# Patient Record
Sex: FEMALE | Race: WHITE | NOT HISPANIC OR LATINO | URBAN - METROPOLITAN AREA
[De-identification: names, ages, dates, MRNs, and addresses within clinical notes are randomized per-mention and may not be internally consistent; named-entity substitution may affect disease eponyms.]

---

## 2018-12-03 ENCOUNTER — AMBULATORY - HEALTHEAST (OUTPATIENT)
Dept: CARDIOLOGY | Facility: CLINIC | Age: 83
End: 2018-12-03

## 2018-12-03 ENCOUNTER — OFFICE VISIT - HEALTHEAST (OUTPATIENT)
Dept: CARDIOLOGY | Facility: CLINIC | Age: 83
End: 2018-12-03

## 2018-12-03 DIAGNOSIS — I42.8 NONISCHEMIC CARDIOMYOPATHY (H): ICD-10-CM

## 2018-12-03 DIAGNOSIS — I50.22 CHRONIC SYSTOLIC HEART FAILURE (H): ICD-10-CM

## 2018-12-03 DIAGNOSIS — I50.31 ACUTE DIASTOLIC CONGESTIVE HEART FAILURE (H): ICD-10-CM

## 2018-12-03 RX ORDER — BISACODYL 5 MG/1
5 TABLET, DELAYED RELEASE ORAL DAILY PRN
Status: SHIPPED | COMMUNITY
Start: 2018-12-03

## 2018-12-03 RX ORDER — FUROSEMIDE 20 MG
20 TABLET ORAL DAILY
Qty: 90 TABLET | Refills: 3 | Status: SHIPPED | OUTPATIENT
Start: 2018-12-03

## 2018-12-03 RX ORDER — CARVEDILOL 6.25 MG/1
6.25 TABLET ORAL 2 TIMES DAILY
Qty: 180 TABLET | Refills: 3 | Status: SHIPPED | OUTPATIENT
Start: 2018-12-03

## 2018-12-03 RX ORDER — LOSARTAN POTASSIUM 25 MG/1
12.5 TABLET ORAL DAILY
Qty: 45 TABLET | Refills: 3 | Status: SHIPPED | OUTPATIENT
Start: 2018-12-03

## 2018-12-03 RX ORDER — ATORVASTATIN CALCIUM 20 MG/1
20 TABLET, FILM COATED ORAL DAILY
Qty: 90 TABLET | Refills: 3 | Status: SHIPPED | OUTPATIENT
Start: 2018-12-03

## 2018-12-03 ASSESSMENT — MIFFLIN-ST. JEOR: SCORE: 1150.29

## 2018-12-31 ENCOUNTER — OFFICE VISIT - HEALTHEAST (OUTPATIENT)
Dept: CARDIOLOGY | Facility: CLINIC | Age: 83
End: 2018-12-31

## 2018-12-31 ENCOUNTER — AMBULATORY - HEALTHEAST (OUTPATIENT)
Dept: CARDIOLOGY | Facility: CLINIC | Age: 83
End: 2018-12-31

## 2018-12-31 DIAGNOSIS — I42.8 NONISCHEMIC CARDIOMYOPATHY (H): ICD-10-CM

## 2018-12-31 DIAGNOSIS — I50.22 CHRONIC SYSTOLIC HEART FAILURE (H): ICD-10-CM

## 2018-12-31 RX ORDER — CARVEDILOL 3.12 MG/1
TABLET ORAL
Qty: 180 TABLET | Refills: 3 | Status: SHIPPED | OUTPATIENT
Start: 2018-12-31

## 2018-12-31 ASSESSMENT — MIFFLIN-ST. JEOR: SCORE: 1123.07

## 2021-06-02 VITALS — WEIGHT: 154 LBS | HEIGHT: 66 IN | BODY MASS INDEX: 24.75 KG/M2

## 2021-06-02 VITALS — BODY MASS INDEX: 23.78 KG/M2 | HEIGHT: 66 IN | WEIGHT: 148 LBS

## 2021-06-16 PROBLEM — N18.30 CKD (CHRONIC KIDNEY DISEASE) STAGE 3, GFR 30-59 ML/MIN (H): Status: ACTIVE | Noted: 2018-11-16

## 2021-06-16 PROBLEM — I42.8 NONISCHEMIC CARDIOMYOPATHY (H): Status: ACTIVE | Noted: 2018-11-19

## 2021-06-16 PROBLEM — J96.01 ACUTE RESPIRATORY FAILURE WITH HYPOXIA (H): Status: ACTIVE | Noted: 2018-11-16

## 2021-06-16 PROBLEM — I16.9 HYPERTENSIVE CRISIS: Status: ACTIVE | Noted: 2018-11-16

## 2021-06-16 PROBLEM — R06.00 DYSPNEA: Status: ACTIVE | Noted: 2018-11-16

## 2021-06-22 NOTE — PROGRESS NOTES
Patient seen in clinic for HF education s/p recent hospital discharge 11/25/18.  Reviewed HF Binder that includes the  HF Sx Awareness & Action plan  handout and  A [employer] Pump  booklet and Weight log booklet highlighting :  __X_patient s type of heart failure _X__Na management in diet  __X_importance of daily wts  _X__Fluid Guidelines, if applicable  __X_medication review and importance of compliance     Instructed patient in signs and sx of heart failure, reiterated when to call clinic - reviewed HF hotline # [phone] and after hours call # [phone].  Majority of time was spent reviewing: Signs and symptoms of heart failure and low sodium diet.   Patient verbalized understanding of HF discussion.  Plan for f/u with continued HF education reviewed.

## 2021-06-22 NOTE — PROGRESS NOTES
Patient seen in clinic for continued HF education.  Patient viewed 'What is heart Failure' video which covers risk factors, symptoms, medications, Na and fluid guidelines, balancing activity and rest, and daily monitoring of symptoms.    Addressed patients questions/concerns- will continue to reinforce HF education with future patient interactions.      Son and patient both viewed video. No questions afterwards. Patient reports that video informative. ADRIANA,Rn

## 2021-06-26 NOTE — PROGRESS NOTES
Progress Notes by Brianda Francis CNP at 12/3/2018  7:50 AM     Author: Brianda Francis CNP Service: -- Author Type: Nurse Practitioner    Filed: 12/3/2018  9:13 AM Encounter Date: 12/3/2018 Status: Signed    : Brianda Francis CNP (Nurse Practitioner)           Click to link to [facility] HEART [facility] NOTE      Assessment/Recommendations   Assessment:    1. Nonischemic cardiomyopathy with systolic dysfunction, NYHA class II: Well compensated.  She has trace lower extremity edema.  Her weights have remained stable since discharge.  We discussed monitoring heart failure symptoms, following a low-sodium diet, monitoring daily weights, and heart failure treatment.  She states she has only been taking her carvedilol daily and started this twice a day this past Friday.  She states she is tolerating taking it twice a day.  We discussed the goal of medication titration and rechecking an echocardiogram in 3 months.  She met with the nurse clinician for further heart failure education.    2.  Hypertension: Controlled.  Blood pressure 128/80    Plan:  1.   Heart failure medications:  - Beta blocker therapy with carvedilol 6.25 mg twice a day  - ARB therapy with losartan 12.5 mg daily  - Diuretic therapy with furosemide 20 mg daily  2.  Continue current medications.  No titration of medications since she just started taking carvedilol twice a day a few days ago.  3.  Continue daily weights and low-sodium diet    Efren Cuevas will follow up with Dr. Talley in 6-8 weeks and in the heart failure clinic in 3 weeks.     History of Present Illness    Efren Cuevas is seen at [facility] heart failure clinic today for post-hospitalization follow-up. Her son Nakul accompanies her today.  She was hospitalized at [facility] from November 15 - November 19, 2018 with shortness of breath.  Her BNP was 1775 on admission.  She received IV Lasix.  Her blood pressure was also 231/121  on admission.  She had a nuclear stress test November 19, 2018 which showed no significant ischemia.  The most recent evaluation of Her ejection fraction was 15-20% from an  echo on 11/16/2018.  Her echocardiogram also showed moderate mitral regurgitation.  She was started on losartan, carvedilol, Lasix, and atorvastatin at discharge. Her past medical history is also significant for hypertension and chronic kidney disease.      Since being discharged from the hospital, Efren feels that she is improving. She has fatigue, mild dyspnea on exertion. She denies orthopnea or PND. She was having dizziness but this has improved.  She denies lightheadedness, shortness of breath, orthopnea, PND, palpitations, chest pain and abdominal fullness/bloating.      Efren Leung weight at discharge was 157 pounds.  She is monitoring home weights which are stable between 153-155 pounds.  She is following a low sodium diet.  She lives alone.        ECHO 11/16/2018:   Summary     1. The left ventricle is normal in size. Left ventricular systolic performance is severely reduced. The ejection fraction is estimated to be 15-20%.   2. There is severe global reduction in left ventricular systolic performance.   3. There is mild aortic insufficiency.   4. There is moderate mitral insufficiency.   5. Normal right ventricular size and systolic performance.   6. There is mild to moderate left atrial enlargement.   7. Right ventricular systolic pressure relative to right atrial pressure is mildly increased.  The pulmonary artery pressure is estimated to be 40-45 mmHg plus right atrial pressure. In addition, the IVC appears dilated with decreased phasic variation in caval diameter consistent with elevated right atrial pressure.   8. There is a very small pericardial effusion.           Physical Examination Review of Systems   Vitals:    12/03/18 0812   BP: 128/80   Pulse: 68   Resp: 18     Body mass index is 24.86 kg/m .  Wt Readings from Last 3  Encounters:   12/03/18 154 lb (69.9 kg)   11/25/18 161 lb (73 kg)   11/18/18 157 lb 12.8 oz (71.6 kg)       General Appearance:     Alert, cooperative and in no acute distress.   ENT/Mouth: membranes moist, no oral lesions or bleeding gums.      EYES:  no scleral icterus, normal conjunctivae   Neck: no carotid bruits or thyromegaly   Chest/Lungs:   lungs are clear to auscultation, no rales or wheezing, respirations unlabored   Cardiovascular:   Regular. Normal first and second heart sounds with no murmurs, rubs, or gallops; the carotid, radial and posterior tibial pulses are intact, Jugular venous pressure normal, trace edema bilateral lower extremities    Abdomen:  Soft, nontender, nondistended, bowel sounds present   Extremities: no cyanosis or clubbing   Skin: warm, dry.    Neurologic: mood and affect are appropriate, alert and oriented x3      General: Weight Loss  Eyes: WNL  Ears/Nose/Throat: WNL  Lungs: WNL  Heart: WNL  Stomach: WNL  Bladder: WNL  Muscle/Joints: WNL  Skin: WNL  Nervous System: WNL  Mental Health: WNL     Blood: WNL     Medical History  Surgical History Family History Social History   History reviewed. No pertinent past medical history. Past Surgical History:   Procedure Laterality Date   ? HYSTERECTOMY      History reviewed. No pertinent family history. Social History     Socioeconomic History   ? Marital status:      Spouse name: Not on file   ? Number of children: Not on file   ? Years of education: Not on file   ? Highest education level: Not on file   Social Needs   ? Financial resource strain: Not on file   ? Food insecurity - worry: Not on file   ? Food insecurity - inability: Not on file   ? Transportation needs - medical: Not on file   ? Transportation needs - non-medical: Not on file   Occupational History   ? Not on file   Tobacco Use   ? Smoking status: Never Smoker   ? Smokeless tobacco: Never Used   Substance and Sexual Activity   ? Alcohol use: No     Frequency: Never   ?  Drug use: No   ? Sexual activity: Not Currently     Partners: Male   Other Topics Concern   ? Not on file   Social History Narrative   ? Not on file          Medications  Allergies   Current Outpatient Medications   Medication Sig Dispense Refill   ? aspirin 81 MG EC tablet Take 1 tablet (81 mg total) by mouth daily.  0   ? atorvastatin (LIPITOR) 20 MG tablet Take 1 tablet (20 mg total) by mouth daily. 90 tablet 3   ? bisacodyl (DULCOLAX) 5 mg EC tablet Take 5 mg by mouth daily as needed for constipation.     ? carvedilol (COREG) 6.25 MG tablet Take 1 tablet (6.25 mg total) by mouth 2 (two) times a day. 180 tablet 3   ? losartan (COZAAR) 25 MG tablet Take 0.5 tablets (12.5 mg total) by mouth daily. 45 tablet 3   ? multivitamin therapeutic tablet Take 1 tablet by mouth daily.     ? furosemide (LASIX) 20 MG tablet Take 1 tablet (20 mg total) by mouth daily. 90 tablet 3     No current facility-administered medications for this visit.       No Known Allergies      Lab Results    Chemistry CBC BNP   Lab Results   Component Value Date    CREATININE 1.72 (H) 11/25/2018    BUN 35 (H) 11/25/2018     11/25/2018    K 4.2 11/25/2018     11/25/2018    CO2 26 11/25/2018     Creatinine (mg/dL)   Date Value   11/25/2018 1.72 (H)   11/18/2018 1.59 (H)   11/17/2018 1.43 (H)   11/16/2018 1.38 (H)    Lab Results   Component Value Date    WBC 9.4 11/25/2018    HGB 9.6 (L) 11/25/2018    HCT 30.8 (L) 11/25/2018    MCV 86 11/25/2018     11/25/2018    Lab Results   Component Value Date    BNP 1,775 (H) 11/15/2018     BNP (pg/mL)   Date Value   11/15/2018 1,775 (H)          40 minutes were spent with the patient with greater than 50% spent on education and counseling.      Brianda Francis, [facility]   Heart Failure Clinic

## 2021-06-27 NOTE — PROGRESS NOTES
Progress Notes by Brianda Francis CNP at 12/31/2018  7:50 AM     Author: Brianda Francis CNP Service: -- Author Type: Nurse Practitioner    Filed: 12/31/2018  8:57 AM Encounter Date: 12/31/2018 Status: Signed    : Brianda Francis CNP (Nurse Practitioner)           Click to link to [facility] HEART CARE NOTE      Assessment/Recommendations   Assessment:    1. Nonischemic cardiomyopathy with systolic dysfunction, NYHA class II: Well compensated.  She continues to have fatigue and dyspnea on exertion.  Her lower extremity edema has improved.  Her weights have remained stable.  She continues to follow a low-sodium diet.  She watched the heart failure video today.  We discussed the goal of medication titration again today.  Will recheck echocardiogram early March.    2.  Hypertension: Uncontrolled.  Blood pressure 160/80 with a recheck of 146/80    Plan:  1.   Heart failure medications:  - Beta blocker therapy with carvedilol increased to 9.375 mg twice a day  - ARB therapy with losartan 12.5 mg daily.  - Diuretic therapy with furosemide 20 mg daily  2.  She is unsure of her medications today and will call [facility] and make sure our med list is correct in the computer  3.  Continue daily weights and low-sodium diet  3.  Encouraged regular exercise    Efren Cuevas will follow up with Dr. Talley February 8 in the heart failure clinic in 2 months.     History of Present Illness    Ms. Efren Cuevas is a 85 y.o. female seen at [facility] heart failure clinic today for continued follow-up.  Her son Nakul accompanies her today.  She follows up for nonischemic cardiomyopathy with systolic dysfunction.  Her most recent echocardiogram was done November 16, 2018 which showed ejection fraction of 15-20% along with moderate mitral regurgitation.  She is a past medical history significant for hypertension and chronic kidney disease stage III.    Today, she continues to  have fatigue and mild dyspnea on exertion.  She states her lower extremity edema has improved.  She denies orthopnea or PND.  She denies lightheadedness, shortness of breath, orthopnea, PND, palpitations, chest pain, abdominal fullness/bloating and lower extremity edema.      She is monitoring home weights which are stable between 147-151 pounds.  She is following a low sodium diet.  She lives alone and is very active in her home.    ECHO 11/16/2018:   Summary     1. The left ventricle is normal in size. Left ventricular systolic performance is severely reduced. The ejection fraction is estimated to be 15-20%.   2. There is severe global reduction in left ventricular systolic performance.   3. There is mild aortic insufficiency.   4. There is moderate mitral insufficiency.   5. Normal right ventricular size and systolic performance.   6. There is mild to moderate left atrial enlargement.   7. Right ventricular systolic pressure relative to right atrial pressure is mildly increased.  The pulmonary artery pressure is estimated to be 40-45 mmHg plus right atrial pressure. In addition, the IVC appears dilated with decreased phasic variation in caval diameter consistent with elevated right atrial pressure.   8. There is a very small pericardial effusion          Physical Examination Review of Systems   Vitals:    12/31/18 0814   BP: 146/80   Pulse:    Resp:      Body mass index is 23.89 kg/m .  Wt Readings from Last 3 Encounters:   12/31/18 148 lb (67.1 kg)   12/03/18 154 lb (69.9 kg)   11/25/18 161 lb (73 kg)       General Appearance:     Alert, cooperative and in no acute distress.   ENT/Mouth: membranes moist, no oral lesions or bleeding gums.      EYES:  no scleral icterus, normal conjunctivae   Neck: no carotid bruits or thyromegaly   Chest/Lungs:   lungs are clear to auscultation, no rales or wheezing, respirations unlabored   Cardiovascular:   Regular. Normal first and second heart sounds with no murmurs, rubs, or  gallops; the carotid, radial and posterior tibial pulses are intact, Jugular venous pressure normal, no edema     Abdomen:  Soft, nontender, nondistended, bowel sounds present   Extremities: no cyanosis or clubbing   Skin: warm, dry.    Neurologic: mood and affect are appropriate, alert and oriented x3      General: WNL  Eyes: WNL  Ears/Nose/Throat: WNL  Lungs: WNL  Heart: WNL  Stomach: WNL  Bladder: WNL  Muscle/Joints: WNL  Skin: WNL  Nervous System: WNL  Mental Health: WNL     Blood: WNL     Medical History  Surgical History Family History Social History   History reviewed. No pertinent past medical history. Past Surgical History:   Procedure Laterality Date   ? HYSTERECTOMY      History reviewed. No pertinent family history. Social History     Socioeconomic History   ? Marital status:      Spouse name: Not on file   ? Number of children: Not on file   ? Years of education: Not on file   ? Highest education level: Not on file   Social Needs   ? Financial resource strain: Not on file   ? Food insecurity - worry: Not on file   ? Food insecurity - inability: Not on file   ? Transportation needs - medical: Not on file   ? Transportation needs - non-medical: Not on file   Occupational History   ? Not on file   Tobacco Use   ? Smoking status: Never Smoker   ? Smokeless tobacco: Never Used   Substance and Sexual Activity   ? Alcohol use: No     Frequency: Never   ? Drug use: No   ? Sexual activity: Not Currently     Partners: Male   Other Topics Concern   ? Not on file   Social History Narrative   ? Not on file          Medications  Allergies   Current Outpatient Medications   Medication Sig Dispense Refill   ? aspirin 81 MG EC tablet Take 1 tablet (81 mg total) by mouth daily.  0   ? atorvastatin (LIPITOR) 20 MG tablet Take 1 tablet (20 mg total) by mouth daily. 90 tablet 3   ? bisacodyl (DULCOLAX) 5 mg EC tablet Take 5 mg by mouth daily as needed for constipation.     ? carvedilol (COREG) 6.25 MG tablet Take 1  tablet (6.25 mg total) by mouth 2 (two) times a day. 180 tablet 3   ? furosemide (LASIX) 20 MG tablet Take 1 tablet (20 mg total) by mouth daily. 90 tablet 3   ? losartan (COZAAR) 25 MG tablet Take 0.5 tablets (12.5 mg total) by mouth daily. 45 tablet 3   ? multivitamin therapeutic tablet Take 1 tablet by mouth daily.     ? carvedilol (COREG) 3.125 MG tablet Take a total of 9.375 mg twice a day. Take one 6.25 mg tablet with one 3.125 mg tablet twice a day 180 tablet 3     No current facility-administered medications for this visit.       No Known Allergies      Lab Results    Chemistry CBC BNP   Lab Results   Component Value Date    CREATININE 1.72 (H) 11/25/2018    BUN 35 (H) 11/25/2018     11/25/2018    K 4.2 11/25/2018     11/25/2018    CO2 26 11/25/2018     Creatinine (mg/dL)   Date Value   11/25/2018 1.72 (H)   11/18/2018 1.59 (H)   11/17/2018 1.43 (H)   11/16/2018 1.38 (H)    Lab Results   Component Value Date    WBC 9.4 11/25/2018    HGB 9.6 (L) 11/25/2018    HCT 30.8 (L) 11/25/2018    MCV 86 11/25/2018     11/25/2018    Lab Results   Component Value Date    BNP 1,775 (H) 11/15/2018     BNP (pg/mL)   Date Value   11/15/2018 1,775 (H)          25 minutes were spent with the patient with greater than 50% spent on education and counseling.      Brianda Francis, [facility]   Heart Failure Clinic

## 2021-07-14 PROBLEM — I50.31 ACUTE DIASTOLIC CONGESTIVE HEART FAILURE (H): Status: RESOLVED | Noted: 2018-11-16 | Resolved: 2018-12-03

## 2021-11-18 ENCOUNTER — LAB REQUISITION (OUTPATIENT)
Dept: LAB | Facility: CLINIC | Age: 86
End: 2021-11-18

## 2021-11-18 DIAGNOSIS — D63.8 ANEMIA IN OTHER CHRONIC DISEASES CLASSIFIED ELSEWHERE: ICD-10-CM

## 2021-11-18 DIAGNOSIS — L08.9 LOCAL INFECTION OF THE SKIN AND SUBCUTANEOUS TISSUE, UNSPECIFIED: ICD-10-CM

## 2021-11-19 LAB
CREAT SERPL-MCNC: 0.81 MG/DL (ref 0.6–1.1)
GFR SERPL CREATININE-BSD FRML MDRD: 65 ML/MIN/1.73M2
HGB BLD-MCNC: 11.3 G/DL (ref 11.7–15.7)
POTASSIUM BLD-SCNC: 3.5 MMOL/L (ref 3.5–5)
WBC # BLD AUTO: 22.7 10E3/UL (ref 4–11)

## 2021-11-19 PROCEDURE — 36415 COLL VENOUS BLD VENIPUNCTURE: CPT | Performed by: INTERNAL MEDICINE

## 2021-11-19 PROCEDURE — 85018 HEMOGLOBIN: CPT | Performed by: INTERNAL MEDICINE

## 2021-11-19 PROCEDURE — 82565 ASSAY OF CREATININE: CPT | Performed by: INTERNAL MEDICINE

## 2021-11-19 PROCEDURE — P9604 ONE-WAY ALLOW PRORATED TRIP: HCPCS | Performed by: INTERNAL MEDICINE

## 2021-11-19 PROCEDURE — 85048 AUTOMATED LEUKOCYTE COUNT: CPT | Performed by: INTERNAL MEDICINE

## 2021-11-19 PROCEDURE — 84132 ASSAY OF SERUM POTASSIUM: CPT | Performed by: INTERNAL MEDICINE

## 2021-11-20 ENCOUNTER — LAB REQUISITION (OUTPATIENT)
Dept: LAB | Facility: CLINIC | Age: 86
End: 2021-11-20

## 2021-11-20 DIAGNOSIS — D72.829 ELEVATED WHITE BLOOD CELL COUNT, UNSPECIFIED: ICD-10-CM

## 2021-11-22 LAB
ANION GAP SERPL CALCULATED.3IONS-SCNC: 15 MMOL/L (ref 5–18)
BASOPHILS # BLD MANUAL: 0 10E3/UL (ref 0–0.2)
BASOPHILS NFR BLD MANUAL: 0 %
BUN SERPL-MCNC: 18 MG/DL (ref 8–28)
CALCIUM SERPL-MCNC: 9.5 MG/DL (ref 8.5–10.5)
CHLORIDE BLD-SCNC: 101 MMOL/L (ref 98–107)
CO2 SERPL-SCNC: 26 MMOL/L (ref 22–31)
CREAT SERPL-MCNC: 0.89 MG/DL (ref 0.6–1.1)
EOSINOPHIL # BLD MANUAL: 0.5 10E3/UL (ref 0–0.7)
EOSINOPHIL NFR BLD MANUAL: 2 %
ERYTHROCYTE [DISTWIDTH] IN BLOOD BY AUTOMATED COUNT: 15 % (ref 10–15)
GFR SERPL CREATININE-BSD FRML MDRD: 58 ML/MIN/1.73M2
GLUCOSE BLD-MCNC: 97 MG/DL (ref 70–125)
HCT VFR BLD AUTO: 34.8 % (ref 35–47)
HGB BLD-MCNC: 10.9 G/DL (ref 11.7–15.7)
LYMPHOCYTES # BLD MANUAL: 1.7 10E3/UL (ref 0.8–5.3)
LYMPHOCYTES NFR BLD MANUAL: 7 %
MCH RBC QN AUTO: 30.4 PG (ref 26.5–33)
MCHC RBC AUTO-ENTMCNC: 31.3 G/DL (ref 31.5–36.5)
MCV RBC AUTO: 97 FL (ref 78–100)
MONOCYTES # BLD MANUAL: 1.4 10E3/UL (ref 0–1.3)
MONOCYTES NFR BLD MANUAL: 6 %
NEUTROPHILS # BLD MANUAL: 20.4 10E3/UL (ref 1.6–8.3)
NEUTROPHILS NFR BLD MANUAL: 85 %
PLAT MORPH BLD: ABNORMAL
PLATELET # BLD AUTO: 575 10E3/UL (ref 150–450)
POTASSIUM BLD-SCNC: 3.2 MMOL/L (ref 3.5–5)
RBC # BLD AUTO: 3.58 10E6/UL (ref 3.8–5.2)
RBC MORPH BLD: ABNORMAL
SODIUM SERPL-SCNC: 142 MMOL/L (ref 136–145)
WBC # BLD AUTO: 24 10E3/UL (ref 4–11)

## 2021-11-22 PROCEDURE — 80048 BASIC METABOLIC PNL TOTAL CA: CPT | Performed by: INTERNAL MEDICINE

## 2021-11-22 PROCEDURE — P9604 ONE-WAY ALLOW PRORATED TRIP: HCPCS | Performed by: INTERNAL MEDICINE

## 2021-11-22 PROCEDURE — 36415 COLL VENOUS BLD VENIPUNCTURE: CPT | Performed by: INTERNAL MEDICINE

## 2021-11-22 PROCEDURE — 85027 COMPLETE CBC AUTOMATED: CPT | Performed by: INTERNAL MEDICINE

## 2021-11-25 ENCOUNTER — LAB REQUISITION (OUTPATIENT)
Dept: LAB | Facility: CLINIC | Age: 86
End: 2021-11-25

## 2021-11-25 DIAGNOSIS — A49.9 BACTERIAL INFECTION, UNSPECIFIED: ICD-10-CM

## 2021-11-29 LAB
ANION GAP SERPL CALCULATED.3IONS-SCNC: 14 MMOL/L (ref 5–18)
BASOPHILS # BLD AUTO: 0.1 10E3/UL (ref 0–0.2)
BASOPHILS NFR BLD AUTO: 1 %
BUN SERPL-MCNC: 18 MG/DL (ref 8–28)
CALCIUM SERPL-MCNC: 9.6 MG/DL (ref 8.5–10.5)
CHLORIDE BLD-SCNC: 103 MMOL/L (ref 98–107)
CO2 SERPL-SCNC: 26 MMOL/L (ref 22–31)
CREAT SERPL-MCNC: 1.06 MG/DL (ref 0.6–1.1)
EOSINOPHIL # BLD AUTO: 0.7 10E3/UL (ref 0–0.7)
EOSINOPHIL NFR BLD AUTO: 4 %
ERYTHROCYTE [DISTWIDTH] IN BLOOD BY AUTOMATED COUNT: 14.9 % (ref 10–15)
GFR SERPL CREATININE-BSD FRML MDRD: 47 ML/MIN/1.73M2
GLUCOSE BLD-MCNC: 77 MG/DL (ref 70–125)
HCT VFR BLD AUTO: 35.2 % (ref 35–47)
HGB BLD-MCNC: 10.9 G/DL (ref 11.7–15.7)
IMM GRANULOCYTES # BLD: 0.2 10E3/UL
IMM GRANULOCYTES NFR BLD: 1 %
LYMPHOCYTES # BLD AUTO: 1.4 10E3/UL (ref 0.8–5.3)
LYMPHOCYTES NFR BLD AUTO: 7 %
MCH RBC QN AUTO: 29.8 PG (ref 26.5–33)
MCHC RBC AUTO-ENTMCNC: 31 G/DL (ref 31.5–36.5)
MCV RBC AUTO: 96 FL (ref 78–100)
MONOCYTES # BLD AUTO: 1.4 10E3/UL (ref 0–1.3)
MONOCYTES NFR BLD AUTO: 7 %
NEUTROPHILS # BLD AUTO: 16.1 10E3/UL (ref 1.6–8.3)
NEUTROPHILS NFR BLD AUTO: 80 %
NRBC # BLD AUTO: 0 10E3/UL
NRBC BLD AUTO-RTO: 0 /100
PLATELET # BLD AUTO: 545 10E3/UL (ref 150–450)
POTASSIUM BLD-SCNC: 3.9 MMOL/L (ref 3.5–5)
RBC # BLD AUTO: 3.66 10E6/UL (ref 3.8–5.2)
SODIUM SERPL-SCNC: 143 MMOL/L (ref 136–145)
WBC # BLD AUTO: 19.9 10E3/UL (ref 4–11)

## 2021-11-29 PROCEDURE — 80048 BASIC METABOLIC PNL TOTAL CA: CPT | Performed by: INTERNAL MEDICINE

## 2021-11-29 PROCEDURE — 85025 COMPLETE CBC W/AUTO DIFF WBC: CPT | Performed by: INTERNAL MEDICINE

## 2021-11-29 PROCEDURE — 36415 COLL VENOUS BLD VENIPUNCTURE: CPT | Performed by: INTERNAL MEDICINE

## 2021-11-29 PROCEDURE — P9604 ONE-WAY ALLOW PRORATED TRIP: HCPCS | Performed by: INTERNAL MEDICINE

## 2021-12-04 ENCOUNTER — LAB REQUISITION (OUTPATIENT)
Dept: LAB | Facility: CLINIC | Age: 86
End: 2021-12-04

## 2021-12-04 DIAGNOSIS — N18.1 CHRONIC KIDNEY DISEASE, STAGE 1: ICD-10-CM

## 2021-12-04 DIAGNOSIS — D72.829 ELEVATED WHITE BLOOD CELL COUNT, UNSPECIFIED: ICD-10-CM

## 2021-12-06 LAB
ANION GAP SERPL CALCULATED.3IONS-SCNC: 11 MMOL/L (ref 5–18)
BASOPHILS # BLD AUTO: 0.1 10E3/UL (ref 0–0.2)
BASOPHILS NFR BLD AUTO: 1 %
BUN SERPL-MCNC: 11 MG/DL (ref 8–28)
CALCIUM SERPL-MCNC: 9 MG/DL (ref 8.5–10.5)
CHLORIDE BLD-SCNC: 106 MMOL/L (ref 98–107)
CO2 SERPL-SCNC: 26 MMOL/L (ref 22–31)
CREAT SERPL-MCNC: 0.82 MG/DL (ref 0.6–1.1)
EOSINOPHIL # BLD AUTO: 0.5 10E3/UL (ref 0–0.7)
EOSINOPHIL NFR BLD AUTO: 4 %
ERYTHROCYTE [DISTWIDTH] IN BLOOD BY AUTOMATED COUNT: 14.9 % (ref 10–15)
GFR SERPL CREATININE-BSD FRML MDRD: 64 ML/MIN/1.73M2
GLUCOSE BLD-MCNC: 107 MG/DL (ref 70–125)
HCT VFR BLD AUTO: 35.6 % (ref 35–47)
HGB BLD-MCNC: 10.9 G/DL (ref 11.7–15.7)
IMM GRANULOCYTES # BLD: 0.1 10E3/UL
IMM GRANULOCYTES NFR BLD: 1 %
LYMPHOCYTES # BLD AUTO: 1.3 10E3/UL (ref 0.8–5.3)
LYMPHOCYTES NFR BLD AUTO: 9 %
MCH RBC QN AUTO: 29.5 PG (ref 26.5–33)
MCHC RBC AUTO-ENTMCNC: 30.6 G/DL (ref 31.5–36.5)
MCV RBC AUTO: 97 FL (ref 78–100)
MONOCYTES # BLD AUTO: 1 10E3/UL (ref 0–1.3)
MONOCYTES NFR BLD AUTO: 7 %
NEUTROPHILS # BLD AUTO: 12.2 10E3/UL (ref 1.6–8.3)
NEUTROPHILS NFR BLD AUTO: 78 %
NRBC # BLD AUTO: 0 10E3/UL
NRBC BLD AUTO-RTO: 0 /100
PLATELET # BLD AUTO: 449 10E3/UL (ref 150–450)
POTASSIUM BLD-SCNC: 4 MMOL/L (ref 3.5–5)
RBC # BLD AUTO: 3.69 10E6/UL (ref 3.8–5.2)
SODIUM SERPL-SCNC: 143 MMOL/L (ref 136–145)
WBC # BLD AUTO: 15.3 10E3/UL (ref 4–11)

## 2021-12-06 PROCEDURE — 82310 ASSAY OF CALCIUM: CPT | Performed by: INTERNAL MEDICINE

## 2021-12-06 PROCEDURE — P9604 ONE-WAY ALLOW PRORATED TRIP: HCPCS | Performed by: INTERNAL MEDICINE

## 2021-12-06 PROCEDURE — 36415 COLL VENOUS BLD VENIPUNCTURE: CPT | Performed by: INTERNAL MEDICINE

## 2021-12-06 PROCEDURE — 85025 COMPLETE CBC W/AUTO DIFF WBC: CPT | Performed by: INTERNAL MEDICINE

## 2021-12-13 ENCOUNTER — LAB REQUISITION (OUTPATIENT)
Dept: LAB | Facility: CLINIC | Age: 86
End: 2021-12-13

## 2021-12-13 DIAGNOSIS — N18.4 CHRONIC KIDNEY DISEASE, STAGE 4 (SEVERE) (H): ICD-10-CM

## 2021-12-14 LAB
ERYTHROCYTE [DISTWIDTH] IN BLOOD BY AUTOMATED COUNT: 15.3 % (ref 10–15)
HCT VFR BLD AUTO: 34.7 % (ref 35–47)
HGB BLD-MCNC: 11 G/DL (ref 11.7–15.7)
MCH RBC QN AUTO: 29.3 PG (ref 26.5–33)
MCHC RBC AUTO-ENTMCNC: 31.7 G/DL (ref 31.5–36.5)
MCV RBC AUTO: 92 FL (ref 78–100)
PLATELET # BLD AUTO: 361 10E3/UL (ref 150–450)
RBC # BLD AUTO: 3.76 10E6/UL (ref 3.8–5.2)
WBC # BLD AUTO: 10 10E3/UL (ref 4–11)

## 2021-12-14 PROCEDURE — P9604 ONE-WAY ALLOW PRORATED TRIP: HCPCS | Performed by: INTERNAL MEDICINE

## 2021-12-14 PROCEDURE — 36415 COLL VENOUS BLD VENIPUNCTURE: CPT | Performed by: INTERNAL MEDICINE

## 2021-12-14 PROCEDURE — 85027 COMPLETE CBC AUTOMATED: CPT | Performed by: INTERNAL MEDICINE
